# Patient Record
Sex: FEMALE | Race: BLACK OR AFRICAN AMERICAN | NOT HISPANIC OR LATINO | Employment: UNEMPLOYED | RURAL
[De-identification: names, ages, dates, MRNs, and addresses within clinical notes are randomized per-mention and may not be internally consistent; named-entity substitution may affect disease eponyms.]

---

## 2017-08-16 ENCOUNTER — OFFICE VISIT (OUTPATIENT)
Dept: FAMILY MEDICINE CLINIC | Facility: CLINIC | Age: 62
End: 2017-08-16

## 2017-08-16 VITALS
TEMPERATURE: 97.4 F | HEIGHT: 60 IN | DIASTOLIC BLOOD PRESSURE: 64 MMHG | HEART RATE: 64 BPM | BODY MASS INDEX: 30.9 KG/M2 | SYSTOLIC BLOOD PRESSURE: 120 MMHG | OXYGEN SATURATION: 98 % | WEIGHT: 157.4 LBS

## 2017-08-16 DIAGNOSIS — N30.90 CYSTITIS: Primary | ICD-10-CM

## 2017-08-16 DIAGNOSIS — L40.9 PSORIASIS: ICD-10-CM

## 2017-08-16 PROCEDURE — 99202 OFFICE O/P NEW SF 15 MIN: CPT | Performed by: NURSE PRACTITIONER

## 2017-08-16 RX ORDER — NAPROXEN 500 MG/1
500 TABLET ORAL DAILY
COMMUNITY
End: 2018-06-18

## 2017-08-16 RX ORDER — FLUCONAZOLE 150 MG/1
150 TABLET ORAL ONCE
Qty: 1 TABLET | Refills: 0 | Status: SHIPPED | OUTPATIENT
Start: 2017-08-16 | End: 2017-08-16

## 2017-08-16 RX ORDER — CIPROFLOXACIN 500 MG/1
500 TABLET, FILM COATED ORAL 2 TIMES DAILY
Qty: 20 TABLET | Refills: 0 | Status: SHIPPED | OUTPATIENT
Start: 2017-08-16 | End: 2018-06-18

## 2017-08-16 NOTE — PROGRESS NOTES
Subjective   Yasmin Diallo is a 62 y.o. female.     Urinary Frequency    This is a new problem. Episode onset: x 7-10 days. The problem occurs every urination. The problem has been unchanged. The quality of the pain is described as aching and burning. The pain is mild. Maximum temperature: ? low grade fever initially, but none since. There is no history of pyelonephritis. Associated symptoms include frequency and urgency. Pertinent negatives include no chills, discharge, flank pain, hematuria, hesitancy, nausea, possible pregnancy, sweats or vomiting. Treatments tried: AZO x 5 days. The treatment provided mild relief. There is no history of catheterization, kidney stones or recurrent UTIs.   Psoriasis   This is a recurrent problem. Episode onset: diagnosed about a year ago and has recurrent flares--this flare started in the last week. The problem occurs daily. The problem is unchanged. Location: primarily elbows. Associated symptoms include itching and plaques. Treatments tried: has steroid cream, unsure of name that she uses occasionally. The treatment provided no relief. She has been using treatment for 1 to 2 years.        The following portions of the patient's history were reviewed and updated as appropriate: allergies, current medications, past medical history, past social history and problem list.    Review of Systems   Constitutional: Positive for fever ( low grade initially, none since). Negative for chills.   Respiratory: Negative for cough, chest tightness and shortness of breath.    Cardiovascular: Negative for chest pain.   Gastrointestinal: Negative for nausea and vomiting.   Genitourinary: Positive for dysuria, frequency and urgency. Negative for decreased urine volume, flank pain, hematuria, hesitancy and vaginal discharge.   Skin: Positive for itching and rash.   Neurological: Negative for headaches.       Objective    /64 (BP Location: Right arm, Patient Position: Sitting, Cuff Size: Adult)   "Pulse 64  Temp 97.4 °F (36.3 °C) (Tympanic)   Ht 60\" (152.4 cm)  Wt 157 lb 6.4 oz (71.4 kg)  SpO2 98%  BMI 30.74 kg/m2    Physical Exam   Constitutional: She is oriented to person, place, and time. She appears well-developed and well-nourished.   Cardiovascular: Normal rate and regular rhythm.    Pulmonary/Chest: Effort normal and breath sounds normal.   Abdominal: Soft. Bowel sounds are normal. There is no tenderness.   Genitourinary:   Genitourinary Comments: No flank pain     Neurological: She is alert and oriented to person, place, and time.   Skin: Rash ( small plaque like rash to extensor surface of bilateral elbows, L>R) noted.        Nursing note and vitals reviewed.    Urine not collected due to SP status and patient currently taking Azo    Assessment/Plan   Yasmin was seen today for urinary frequency, difficulty urinating and psoriasis.    Diagnoses and all orders for this visit:    Cystitis  -     ciprofloxacin (CIPRO) 500 MG tablet; Take 1 tablet by mouth 2 (Two) Times a Day.    Psoriasis  -     triamcinolone (KENALOG) 0.1 % ointment; Apply  topically 2 (Two) Times a Day.    Other orders  -     fluconazole (DIFLUCAN) 150 MG tablet; Take 1 tablet by mouth 1 (One) Time for 1 dose. Prn yeast    Increase water, cranberry juice or pills  D/C Azo due to length of time of use  Empty bladder frequently    Will try Triamcinalone ointment for psoriasis since cream has not been effective    RTC or see PCP if symptoms persist/worsen (is visiting from [place] and will be returning next week)               "

## 2017-08-16 NOTE — PATIENT INSTRUCTIONS
Psoriasis  Psoriasis is a long-term (chronic) condition of skin inflammation. It occurs because your immune system causes skin cells to form too quickly. As a result, too many skin cells grow and create raised, red patches (plaques) that look silvery on your skin. Plaques may appear anywhere on your body. They can be any size or shape.  Psoriasis can come and go. The condition varies from mild to very severe. It cannot be passed from one person to another (not contagious).   CAUSES   The cause of psoriasis is not known, but certain factors can make the condition worse. These include:   · Damage or trauma to the skin, such as cuts, scrapes, sunburn, and dryness.  · Lack of sunlight.  · Certain medicines.  · Alcohol.  · Tobacco use.  · Stress.  · Infections caused by bacteria or viruses.  RISK FACTORS  This condition is more likely to develop in:  · People with a family history of psoriasis.  · People who are .  · People who are between the ages of 15-30 and 50-60 years old.  SYMPTOMS   There are five different types of psoriasis. You can have more than one type of psoriasis during your life. Types are:   · Plaque.  · Guttate.  · Inverse.  · Pustular.  · Erythrodermic.  Each type of psoriasis has different symptoms.   · Plaque psoriasis symptoms include red, raised plaques with a silvery white coating (scale). These plaques may be itchy. Your nails may be pitted and crumbly or fall off.  · Guttate psoriasis symptoms include small red spots that often show up on your trunk, arms, and legs. These spots may develop after you have been sick, especially with strep throat.  · Inverse psoriasis symptoms include plaques in your underarm area, under your breasts, or on your genitals, groin, or buttocks.  · Pustular psoriasis symptoms include pus-filled bumps that are painful, red, and swollen on the palms of your hands or the soles of your feet. You also may feel exhausted, feverish, weak, or have no  appetite.  · Erythrodermic psoriasis symptoms include bright red skin that may look burned. You may have a fast heartbeat and a body temperature that is too high or too low. You may be itchy or in pain.  DIAGNOSIS   Your health care provider may suspect psoriasis based on your symptoms and family history. Your health care provider will also do a physical exam. This may include a procedure to remove a tissue sample (biopsy) for testing. You may also be referred to a health care provider who specializes in skin diseases (dermatologist).   TREATMENT  There is no cure for this condition, but treatment can help manage it. Goals of treatment include:   · Helping your skin heal.  · Reducing itching and inflammation.  · Slowing the growth of new skin cells.  · Helping your immune system respond better to your skin.  Treatment varies, depending on the severity of your condition. Treatment may include:   · Creams or ointments.  · Ultraviolet ray exposure (light therapy). This may include natural sunlight or light therapy in a medical office.  · Medicines (systemic therapy). These medicines can help your body better manage skin cell turnover and inflammation. They may be used along with light therapy or ointments. You may also get antibiotic medicines if you have an infection.  HOME CARE INSTRUCTIONS  Skin Care   · Moisturize your skin as needed. Only use moisturizers that have been approved by your health care provider.    · Apply cool compresses to the affected areas.    · Do not scratch your skin.    Lifestyle   · Do not use tobacco products. This includes cigarettes, chewing tobacco, and e-cigarettes. If you need help quitting, ask your health care provider.  · Drink little or no alcohol.     · Try techniques for stress reduction, such as meditation or yoga.  · Get exposure to the sun as told by your health care provider. Do not get sunburned.    · Consider joining a psoriasis support group.    Medicines   · Take or use  over-the-counter and prescription medicines only as told by your health care provider.  · If you were prescribed an antibiotic, take or use it as told by your health care provider. Do not stop taking the antibiotic even if your condition starts to improve.  General Instructions   · Keep a journal to help track what triggers an outbreak. Try to avoid any triggers.    · See a counselor or  if feelings of sadness, frustration, and hopelessness about your condition are interfering with your work and relationships.  · Keep all follow-up visits as told by your health care provider. This is important.  SEEK MEDICAL CARE IF:  · Your pain gets worse.  · You have increasing redness or warmth in the affected areas.    · You have new or worsening pain or stiffness in your joints.  · Your nails start to break easily or pull away from the nail bed.    · You have a fever.    · You feel depressed.     This information is not intended to replace advice given to you by your health care provider. Make sure you discuss any questions you have with your health care provider.     Document Released: 12/15/2001 Document Revised: 09/07/2016 Document Reviewed: 05/04/2016  [employer] Interactive Patient Education ©2017 [employer]  Urinary Tract Infection, Adult  A urinary tract infection (UTI) is an infection of any part of the urinary tract, which includes the kidneys, ureters, bladder, and urethra. These organs make, store, and get rid of urine in the body. UTI can be a bladder infection (cystitis) or kidney infection (pyelonephritis).  CAUSES  This infection may be caused by fungi, viruses, or bacteria. Bacteria are the most common cause of UTIs. This condition can also be caused by repeated incomplete emptying of the bladder during urination.   RISK FACTORS  This condition is more likely to develop if:   · You ignore your need to urinate or hold urine for long periods of time.  · You do not empty your bladder completely  during urination.  · You wipe back to front after urinating or having a bowel movement, if you are female.  · You are uncircumcised, if you are male.    · You are constipated.  · You have a urinary catheter that stays in place (indwelling).  · You have a weak defense (immune) system.  · You have a medical condition that affects your bowels, kidneys, or bladder.  · You have diabetes.  · You take antibiotic medicines frequently or for long periods of time, and the antibiotics no longer work well against certain types of infections (antibiotic resistance).  · You take medicines that irritate your urinary tract.  · You are exposed to chemicals that irritate your urinary tract.  · You are female.  SYMPTOMS   Symptoms of this condition include:   · Fever.    · Frequent urination or passing small amounts of urine frequently.    · Needing to urinate urgently.    · Pain or burning with urination.    · Urine that smells bad or unusual.    · Cloudy urine.    · Pain in the lower abdomen or back.    · Trouble urinating.    · Blood in the urine.    · Vomiting or being less hungry than normal.     · Diarrhea or abdominal pain.    · Vaginal discharge, if you are female.  DIAGNOSIS  This condition is diagnosed with a medical history and physical exam. You will also need to provide a urine sample to test your urine. Other tests may be done, including:    · Blood tests.    · Sexually transmitted disease (STD) testing.     If you have had more than one UTI, a cystoscopy or imaging studies may be done to determine the cause of the infections.   TREATMENT   Treatment for this condition often includes a combination of two or more of the following:   · Antibiotic medicine.    · Other medicines to treat less common causes of UTI.    · Over-the-counter medicines to treat pain.    · Drinking enough water to stay hydrated.  HOME CARE INSTRUCTIONS  · Take over-the-counter and prescription medicines only as told by your health care  provider.  · If you were prescribed an antibiotic, take it as told by your health care provider. Do not stop taking the antibiotic even if you start to feel better.  · Avoid alcohol, caffeine, tea, and carbonated beverages. They can irritate your bladder.  · Drink enough fluid to keep your urine clear or pale yellow.  · Keep all follow-up visits as told by your health care provider. This is important.  · Make sure to:    Empty your bladder often and completely. Do not hold urine for long periods of time.    Empty your bladder before and after sex.    Wipe from front to back after a bowel movement if you are female. Use each tissue one time when you wipe.  SEEK MEDICAL CARE IF:   · You have back pain.    · You have a fever.  · You feel nauseous or vomit.    · Your symptoms do not get better after 3 days.     · Your symptoms go away and then return.  SEEK IMMEDIATE MEDICAL CARE IF:   · You have severe back pain or lower abdominal pain.    · You are vomiting and cannot keep down any medicines or water.     This information is not intended to replace advice given to you by your health care provider. Make sure you discuss any questions you have with your health care provider.     Document Released: 09/27/2006 Document Revised: 04/10/2017 Document Reviewed: 11/07/2016  [employer] Interactive Patient Education ©2017 [employer]

## 2018-06-18 ENCOUNTER — OFFICE VISIT (OUTPATIENT)
Dept: FAMILY MEDICINE CLINIC | Facility: CLINIC | Age: 63
End: 2018-06-18

## 2018-06-18 VITALS
WEIGHT: 149.2 LBS | TEMPERATURE: 97.1 F | HEIGHT: 59 IN | OXYGEN SATURATION: 96 % | DIASTOLIC BLOOD PRESSURE: 70 MMHG | SYSTOLIC BLOOD PRESSURE: 121 MMHG | BODY MASS INDEX: 30.08 KG/M2 | HEART RATE: 67 BPM

## 2018-06-18 DIAGNOSIS — B02.9 HERPES ZOSTER WITHOUT COMPLICATION: Primary | ICD-10-CM

## 2018-06-18 DIAGNOSIS — L40.9 PSORIASIS: ICD-10-CM

## 2018-06-18 PROCEDURE — 99214 OFFICE O/P EST MOD 30 MIN: CPT | Performed by: NURSE PRACTITIONER

## 2018-06-18 RX ORDER — VALACYCLOVIR HYDROCHLORIDE 1 G/1
1000 TABLET, FILM COATED ORAL 3 TIMES DAILY
Qty: 21 TABLET | Refills: 0 | Status: SHIPPED | OUTPATIENT
Start: 2018-06-18

## 2018-06-18 RX ORDER — GABAPENTIN 100 MG/1
CAPSULE ORAL
Qty: 30 CAPSULE | Refills: 0 | Status: SHIPPED | OUTPATIENT
Start: 2018-06-18

## 2018-06-18 NOTE — PROGRESS NOTES
"Subjective   Yasmin Diallo is a 63 y.o. female.     CC: \"upper left arm achy, ? Insect bite, aching left of neck and face\"    Patient lives in [place], but also has a home here and arrived on 6-13-18.  Reports being under increased stress over the last couple of weeks. Did have varicella as a child.  Has never had zoster or vaccine.     Requesting refills of Triamcinalone ointment that we had given her last summer when she was seen for psoriasis flares.  Sees PCP and dermatology in [place], but reports the medication is less expensive if she gets it filled in [place]. Only has to use occasionally and seems to be beneficial.        Insect Bite   This is a new problem. Episode onset: 6-13-18. The problem occurs daily. The problem has been gradually worsening. Associated symptoms include headaches ( mild left side), myalgias ( mild) and a rash. Pertinent negatives include no abdominal pain, anorexia, arthralgias, change in bowel habit, chest pain, chills, congestion, coughing, diaphoresis, fatigue, fever, joint swelling, nausea, neck pain, numbness, sore throat, swollen glands, urinary symptoms, vertigo, visual change, vomiting or weakness. Nothing aggravates the symptoms. She has tried nothing for the symptoms.   Headache    This is a new problem. The current episode started in the past 7 days. The problem occurs intermittently (shooting pain from left shoulder/neck area to left temple/ear region). The problem has been unchanged. The pain is located in the left unilateral region. The quality of the pain is described as shooting. The pain is at a severity of 5/10. Pertinent negatives include no abdominal pain, abnormal behavior, anorexia, back pain, blurred vision, coughing, dizziness, drainage, ear pain, eye pain, eye redness, eye watering, facial sweating, fever, hearing loss, insomnia, loss of balance, muscle aches, nausea, neck pain, numbness, phonophobia, photophobia, rhinorrhea, scalp tenderness, seizures, sinus " "pressure, sore throat, swollen glands, tingling, tinnitus, visual change, vomiting, weakness or weight loss. She has tried nothing for the symptoms. There is no history of hypertension, migraine headaches or recent head traumas.        The following portions of the patient's history were reviewed and updated as appropriate: allergies, current medications, past medical history, past social history, past surgical history and problem list.    Review of Systems   Constitutional: Negative for chills, diaphoresis, fatigue, fever and unexpected weight loss.   HENT: Negative for congestion, ear pain, hearing loss, rhinorrhea, sinus pressure, sore throat and tinnitus.    Eyes: Negative for blurred vision, photophobia, pain and redness.   Respiratory: Negative for cough.    Cardiovascular: Negative for chest pain.   Gastrointestinal: Negative for abdominal pain, anorexia, change in bowel habit, nausea and vomiting.   Genitourinary: Negative for difficulty urinating, dysuria, flank pain and frequency.   Musculoskeletal: Positive for myalgias ( mild). Negative for arthralgias, back pain, joint swelling and neck pain.   Skin: Positive for rash.   Neurological: Negative for dizziness, vertigo, tingling, seizures, weakness, numbness and loss of balance.   Psychiatric/Behavioral: The patient does not have insomnia.        Objective    /70 (BP Location: Left arm, Patient Position: Sitting, Cuff Size: Adult)   Pulse 67   Temp 97.1 °F (36.2 °C) (Tympanic)   Ht 149.9 cm (59\")   Wt 67.7 kg (149 lb 3.2 oz)   SpO2 96%   Breastfeeding? No   BMI 30.13 kg/m²     Physical Exam   Constitutional: She is oriented to person, place, and time. She appears well-developed and well-nourished. No distress.   HENT:   Head: Normocephalic and atraumatic.   Right Ear: Tympanic membrane and ear canal normal.   Left Ear: Tympanic membrane and ear canal normal.   Nose: Mucosal edema ( slighlty pale, boggy) present. Right sinus exhibits no " maxillary sinus tenderness and no frontal sinus tenderness. Left sinus exhibits no maxillary sinus tenderness and no frontal sinus tenderness.   Mouth/Throat: Uvula is midline, oropharynx is clear and moist and mucous membranes are normal.   Eyes: Conjunctivae are normal. Right eye exhibits no discharge. Left eye exhibits no discharge.   Neck: Neck supple.   Cardiovascular: Normal rate and regular rhythm.    Pulmonary/Chest: Effort normal and breath sounds normal.   Lymphadenopathy:     She has no cervical adenopathy.   Neurological: She is alert and oriented to person, place, and time.   Skin: Skin is warm and dry. Rash noted.        Nursing note and vitals reviewed.        Assessment/Plan   Yasmin was seen today for insect bite, facial pain and headache.    Diagnoses and all orders for this visit:    Herpes zoster without complication  -     valACYclovir (VALTREX) 1000 MG tablet; Take 1 tablet by mouth 3 (Three) Times a Day.  -     gabapentin (NEURONTIN) 100 MG capsule; 1 capsule po QHS x 24 hours, may increase to BID on day 2 and then TID on day 3 if needed. Taper down at completion.    Psoriasis  -     triamcinolone (KENALOG) 0.1 % ointment; Apply  topically 2 (Two) Times a Day As Needed (psoriasis).      Rx for Valtrex, Neurontin for Zoster  Discussed Zoster at length and possibility of PHN.  Informational sheet provided.     Refill of Triamcinalone ointment for psoriasis provided    See PCP for regular f/u and routine labs (due in a couple months)  See PCP if persistent pain associated with zoster.

## 2018-06-18 NOTE — PATIENT INSTRUCTIONS
Shingles  Shingles, which is also known as herpes zoster, is an infection that causes a painful skin rash and fluid-filled blisters. Shingles is not related to genital herpes, which is a sexually transmitted infection.  Shingles only develops in people who:  · Have had chickenpox.  · Have received the chickenpox vaccine. (This is rare.)    What are the causes?  Shingles is caused by varicella-zoster virus (VZV). This is the same virus that causes chickenpox. After exposure to VZV, the virus stays in the body in an inactive (dormant) state. Shingles develops if the virus reactivates. This can happen many years after the initial exposure to VZV. It is not known what causes this virus to reactivate.  What increases the risk?  People who have had chickenpox or received the chickenpox vaccine are at risk for shingles. Infection is more common in people who:  · Are older than age 50.  · Have a weakened defense (immune) system, such as those with HIV, AIDS, or cancer.  · Are taking medicines that weaken the immune system, such as transplant medicines.  · Are under great stress.    What are the signs or symptoms?  Early symptoms of this condition include itching, tingling, and pain in an area on your skin. Pain may be described as burning, stabbing, or throbbing.  A few days or weeks after symptoms start, a painful red rash appears, usually on one side of the body in a bandlike or beltlike pattern. The rash eventually turns into fluid-filled blisters that break open, scab over, and dry up in about 2-3 weeks.  At any time during the infection, you may also develop:  · A fever.  · Chills.  · A headache.  · An upset stomach.    How is this diagnosed?  This condition is diagnosed with a skin exam. Sometimes, skin or fluid samples are taken from the blisters before a diagnosis is made. These samples are examined under a microscope or sent to a lab for testing.  How is this treated?  There is no specific cure for this condition.  Your health care provider will probably prescribe medicines to help you manage pain, recover more quickly, and avoid long-term problems. Medicines may include:  · Antiviral drugs.  · Anti-inflammatory drugs.  · Pain medicines.    If the area involved is on your face, you may be referred to a specialist, such as an eye doctor (ophthalmologist) or an ear, nose, and throat (ENT) doctor to help you avoid eye problems, chronic pain, or disability.  Follow these instructions at home:  Medicines  · Take medicines only as directed by your health care provider.  · Apply an anti-itch or numbing cream to the affected area as directed by your health care provider.  Blister and Rash Care  · Take a cool bath or apply cool compresses to the area of the rash or blisters as directed by your health care provider. This may help with pain and itching.  · Keep your rash covered with a loose bandage (dressing). Wear loose-fitting clothing to help ease the pain of material rubbing against the rash.  · Keep your rash and blisters clean with mild soap and cool water or as directed by your health care provider.  · Check your rash every day for signs of infection. These include redness, swelling, and pain that lasts or increases.  · Do not pick your blisters.  · Do not scratch your rash.  General instructions  · Rest as directed by your health care provider.  · Keep all follow-up visits as directed by your health care provider. This is important.  · Until your blisters scab over, your infection can cause chickenpox in people who have never had it or been vaccinated against it. To prevent this from happening, avoid contact with other people, especially:  ? Babies.  ? Pregnant women.  ? Children who have eczema.  ? Elderly people who have transplants.  ? People who have chronic illnesses, such as leukemia or AIDS.  Contact a health care provider if:  · Your pain is not relieved with prescribed medicines.  · Your pain does not get better after  the rash heals.  · Your rash looks infected. Signs of infection include redness, swelling, and pain that lasts or increases.  Get help right away if:  · The rash is on your face or nose.  · You have facial pain, pain around your eye area, or loss of feeling on one side of your face.  · You have ear pain or you have ringing in your ear.  · You have loss of taste.  · Your condition gets worse.  This information is not intended to replace advice given to you by your health care provider. Make sure you discuss any questions you have with your health care provider.  Document Released: 12/18/2006 Document Revised: 08/13/2017 Document Reviewed: 10/29/2015  [employer] Interactive Patient Education © 2018 [employer]